# Patient Record
Sex: MALE | Race: WHITE | NOT HISPANIC OR LATINO
[De-identification: names, ages, dates, MRNs, and addresses within clinical notes are randomized per-mention and may not be internally consistent; named-entity substitution may affect disease eponyms.]

---

## 2019-08-22 ENCOUNTER — APPOINTMENT (OUTPATIENT)
Dept: FAMILY MEDICINE | Facility: CLINIC | Age: 54
End: 2019-08-22

## 2019-11-13 ENCOUNTER — APPOINTMENT (OUTPATIENT)
Dept: FAMILY MEDICINE | Facility: CLINIC | Age: 54
End: 2019-11-13

## 2019-11-27 ENCOUNTER — APPOINTMENT (OUTPATIENT)
Dept: FAMILY MEDICINE | Facility: CLINIC | Age: 54
End: 2019-11-27

## 2020-09-11 ENCOUNTER — INPATIENT (INPATIENT)
Facility: HOSPITAL | Age: 55
LOS: 3 days | Discharge: HOME | DRG: 558 | End: 2020-09-15
Attending: INTERNAL MEDICINE | Admitting: HOSPITALIST
Payer: MEDICAID

## 2020-09-11 VITALS
RESPIRATION RATE: 18 BRPM | TEMPERATURE: 99 F | DIASTOLIC BLOOD PRESSURE: 65 MMHG | OXYGEN SATURATION: 97 % | SYSTOLIC BLOOD PRESSURE: 94 MMHG | WEIGHT: 195.11 LBS | HEART RATE: 88 BPM

## 2020-09-11 DIAGNOSIS — M70.41 PREPATELLAR BURSITIS, RIGHT KNEE: ICD-10-CM

## 2020-09-11 DIAGNOSIS — L03.115 CELLULITIS OF RIGHT LOWER LIMB: ICD-10-CM

## 2020-09-11 LAB
ALBUMIN SERPL ELPH-MCNC: 3.5 G/DL — SIGNIFICANT CHANGE UP (ref 3.3–5)
ALP SERPL-CCNC: 70 U/L — SIGNIFICANT CHANGE UP (ref 40–120)
ALT FLD-CCNC: 22 U/L — SIGNIFICANT CHANGE UP (ref 12–78)
ANION GAP SERPL CALC-SCNC: 3 MMOL/L — LOW (ref 5–17)
APPEARANCE UR: ABNORMAL
APTT BLD: 34 SEC — SIGNIFICANT CHANGE UP (ref 27.5–35.5)
AST SERPL-CCNC: 19 U/L — SIGNIFICANT CHANGE UP (ref 15–37)
BASOPHILS # BLD AUTO: 0.06 K/UL — SIGNIFICANT CHANGE UP (ref 0–0.2)
BASOPHILS NFR BLD AUTO: 0.6 % — SIGNIFICANT CHANGE UP (ref 0–2)
BILIRUB SERPL-MCNC: 0.6 MG/DL — SIGNIFICANT CHANGE UP (ref 0.2–1.2)
BILIRUB UR-MCNC: NEGATIVE — SIGNIFICANT CHANGE UP
BUN SERPL-MCNC: 11 MG/DL — SIGNIFICANT CHANGE UP (ref 7–23)
CALCIUM SERPL-MCNC: 8.8 MG/DL — SIGNIFICANT CHANGE UP (ref 8.5–10.1)
CHLORIDE SERPL-SCNC: 106 MMOL/L — SIGNIFICANT CHANGE UP (ref 96–108)
CO2 SERPL-SCNC: 30 MMOL/L — SIGNIFICANT CHANGE UP (ref 22–31)
COLOR SPEC: SIGNIFICANT CHANGE UP
CREAT SERPL-MCNC: 0.94 MG/DL — SIGNIFICANT CHANGE UP (ref 0.5–1.3)
CRP SERPL-MCNC: 2.99 MG/DL — HIGH (ref 0–0.4)
DIFF PNL FLD: NEGATIVE — SIGNIFICANT CHANGE UP
EOSINOPHIL # BLD AUTO: 0.15 K/UL — SIGNIFICANT CHANGE UP (ref 0–0.5)
EOSINOPHIL NFR BLD AUTO: 1.4 % — SIGNIFICANT CHANGE UP (ref 0–6)
EPI CELLS # UR: SIGNIFICANT CHANGE UP
GLUCOSE SERPL-MCNC: 87 MG/DL — SIGNIFICANT CHANGE UP (ref 70–99)
GLUCOSE UR QL: NEGATIVE — SIGNIFICANT CHANGE UP
HCT VFR BLD CALC: 40.1 % — SIGNIFICANT CHANGE UP (ref 39–50)
HGB BLD-MCNC: 13.4 G/DL — SIGNIFICANT CHANGE UP (ref 13–17)
IMM GRANULOCYTES NFR BLD AUTO: 0.3 % — SIGNIFICANT CHANGE UP (ref 0–1.5)
INR BLD: 1.28 RATIO — HIGH (ref 0.88–1.16)
KETONES UR-MCNC: NEGATIVE — SIGNIFICANT CHANGE UP
LACTATE SERPL-SCNC: 0.7 MMOL/L — SIGNIFICANT CHANGE UP (ref 0.7–2)
LACTATE SERPL-SCNC: 2.3 MMOL/L — HIGH (ref 0.7–2)
LEUKOCYTE ESTERASE UR-ACNC: NEGATIVE — SIGNIFICANT CHANGE UP
LYMPHOCYTES # BLD AUTO: 1.94 K/UL — SIGNIFICANT CHANGE UP (ref 1–3.3)
LYMPHOCYTES # BLD AUTO: 18.6 % — SIGNIFICANT CHANGE UP (ref 13–44)
MCHC RBC-ENTMCNC: 30.1 PG — SIGNIFICANT CHANGE UP (ref 27–34)
MCHC RBC-ENTMCNC: 33.4 GM/DL — SIGNIFICANT CHANGE UP (ref 32–36)
MCV RBC AUTO: 90.1 FL — SIGNIFICANT CHANGE UP (ref 80–100)
MONOCYTES # BLD AUTO: 1.2 K/UL — HIGH (ref 0–0.9)
MONOCYTES NFR BLD AUTO: 11.5 % — SIGNIFICANT CHANGE UP (ref 2–14)
NEUTROPHILS # BLD AUTO: 7.06 K/UL — SIGNIFICANT CHANGE UP (ref 1.8–7.4)
NEUTROPHILS NFR BLD AUTO: 67.6 % — SIGNIFICANT CHANGE UP (ref 43–77)
NITRITE UR-MCNC: NEGATIVE — SIGNIFICANT CHANGE UP
NRBC # BLD: 0 /100 WBCS — SIGNIFICANT CHANGE UP (ref 0–0)
PH UR: 6 — SIGNIFICANT CHANGE UP (ref 5–8)
PLATELET # BLD AUTO: 237 K/UL — SIGNIFICANT CHANGE UP (ref 150–400)
POTASSIUM SERPL-MCNC: 3.8 MMOL/L — SIGNIFICANT CHANGE UP (ref 3.5–5.3)
POTASSIUM SERPL-SCNC: 3.8 MMOL/L — SIGNIFICANT CHANGE UP (ref 3.5–5.3)
PROT SERPL-MCNC: 7.2 G/DL — SIGNIFICANT CHANGE UP (ref 6–8.3)
PROT UR-MCNC: NEGATIVE — SIGNIFICANT CHANGE UP
PROTHROM AB SERPL-ACNC: 14.8 SEC — HIGH (ref 10.6–13.6)
RBC # BLD: 4.45 M/UL — SIGNIFICANT CHANGE UP (ref 4.2–5.8)
RBC # FLD: 12.8 % — SIGNIFICANT CHANGE UP (ref 10.3–14.5)
SARS-COV-2 RNA SPEC QL NAA+PROBE: SIGNIFICANT CHANGE UP
SODIUM SERPL-SCNC: 139 MMOL/L — SIGNIFICANT CHANGE UP (ref 135–145)
SP GR SPEC: 1 — LOW (ref 1.01–1.02)
UROBILINOGEN FLD QL: NEGATIVE — SIGNIFICANT CHANGE UP
WBC # BLD: 10.44 K/UL — SIGNIFICANT CHANGE UP (ref 3.8–10.5)
WBC # FLD AUTO: 10.44 K/UL — SIGNIFICANT CHANGE UP (ref 3.8–10.5)

## 2020-09-11 PROCEDURE — 73702 CT LWR EXTREMITY W/O&W/DYE: CPT | Mod: 26,RT

## 2020-09-11 PROCEDURE — 73562 X-RAY EXAM OF KNEE 3: CPT | Mod: 26,RT

## 2020-09-11 PROCEDURE — 99222 1ST HOSP IP/OBS MODERATE 55: CPT

## 2020-09-11 PROCEDURE — 71046 X-RAY EXAM CHEST 2 VIEWS: CPT | Mod: 26

## 2020-09-11 PROCEDURE — 99285 EMERGENCY DEPT VISIT HI MDM: CPT

## 2020-09-11 PROCEDURE — 93010 ELECTROCARDIOGRAM REPORT: CPT

## 2020-09-11 PROCEDURE — 93970 EXTREMITY STUDY: CPT | Mod: 26

## 2020-09-11 RX ORDER — VANCOMYCIN HCL 1 G
1350 VIAL (EA) INTRAVENOUS ONCE
Refills: 0 | Status: DISCONTINUED | OUTPATIENT
Start: 2020-09-11 | End: 2020-09-11

## 2020-09-11 RX ORDER — ENOXAPARIN SODIUM 100 MG/ML
40 INJECTION SUBCUTANEOUS DAILY
Refills: 0 | Status: DISCONTINUED | OUTPATIENT
Start: 2020-09-11 | End: 2020-09-15

## 2020-09-11 RX ORDER — ONDANSETRON 8 MG/1
4 TABLET, FILM COATED ORAL EVERY 6 HOURS
Refills: 0 | Status: DISCONTINUED | OUTPATIENT
Start: 2020-09-11 | End: 2020-09-15

## 2020-09-11 RX ORDER — IBUPROFEN 200 MG
600 TABLET ORAL EVERY 6 HOURS
Refills: 0 | Status: DISCONTINUED | OUTPATIENT
Start: 2020-09-11 | End: 2020-09-15

## 2020-09-11 RX ORDER — VANCOMYCIN HCL 1 G
1250 VIAL (EA) INTRAVENOUS EVERY 12 HOURS
Refills: 0 | Status: DISCONTINUED | OUTPATIENT
Start: 2020-09-11 | End: 2020-09-15

## 2020-09-11 RX ORDER — OXYCODONE HYDROCHLORIDE 5 MG/1
5 TABLET ORAL EVERY 6 HOURS
Refills: 0 | Status: DISCONTINUED | OUTPATIENT
Start: 2020-09-11 | End: 2020-09-15

## 2020-09-11 RX ORDER — SODIUM CHLORIDE 9 MG/ML
1000 INJECTION, SOLUTION INTRAVENOUS
Refills: 0 | Status: DISCONTINUED | OUTPATIENT
Start: 2020-09-11 | End: 2020-09-11

## 2020-09-11 RX ORDER — ACETAMINOPHEN 500 MG
650 TABLET ORAL EVERY 6 HOURS
Refills: 0 | Status: DISCONTINUED | OUTPATIENT
Start: 2020-09-11 | End: 2020-09-15

## 2020-09-11 RX ORDER — PIPERACILLIN AND TAZOBACTAM 4; .5 G/20ML; G/20ML
3.38 INJECTION, POWDER, LYOPHILIZED, FOR SOLUTION INTRAVENOUS EVERY 8 HOURS
Refills: 0 | Status: DISCONTINUED | OUTPATIENT
Start: 2020-09-11 | End: 2020-09-11

## 2020-09-11 RX ORDER — SODIUM CHLORIDE 9 MG/ML
2700 INJECTION INTRAMUSCULAR; INTRAVENOUS; SUBCUTANEOUS ONCE
Refills: 0 | Status: COMPLETED | OUTPATIENT
Start: 2020-09-11 | End: 2020-09-11

## 2020-09-11 RX ORDER — VANCOMYCIN HCL 1 G
1500 VIAL (EA) INTRAVENOUS ONCE
Refills: 0 | Status: COMPLETED | OUTPATIENT
Start: 2020-09-11 | End: 2020-09-11

## 2020-09-11 RX ORDER — KETOROLAC TROMETHAMINE 30 MG/ML
30 SYRINGE (ML) INJECTION ONCE
Refills: 0 | Status: DISCONTINUED | OUTPATIENT
Start: 2020-09-11 | End: 2020-09-11

## 2020-09-11 RX ADMIN — SODIUM CHLORIDE 2700 MILLILITER(S): 9 INJECTION INTRAMUSCULAR; INTRAVENOUS; SUBCUTANEOUS at 08:37

## 2020-09-11 RX ADMIN — Medication 300 MILLIGRAM(S): at 10:00

## 2020-09-11 RX ADMIN — Medication 30 MILLIGRAM(S): at 14:50

## 2020-09-11 RX ADMIN — Medication 30 MILLIGRAM(S): at 08:30

## 2020-09-11 RX ADMIN — Medication 166.67 MILLIGRAM(S): at 21:33

## 2020-09-11 NOTE — CONSULT NOTE ADULT - PROBLEM SELECTOR RECOMMENDATION 2
as above  Thank you for consulting us and involving us in the management of this most interesting and challenging case.     We will follow along in the care of this patient. Please call us at [phone] with any concerns.

## 2020-09-11 NOTE — ED PROVIDER NOTE - SKIN, MLM
Skin normal color for race, warm, dry and a granulating laceration with surrounding cellulitis over r knee

## 2020-09-11 NOTE — CONSULT NOTE ADULT - SUBJECTIVE AND OBJECTIVE BOX
55M presents for evaluation of severe right knee pain that has been progressive with redness. 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 He did not seek medical attention at that time, and was able to ambulate. He reports that the pain has been increasing with overlying redness. He denies fever/chills. Denies head trauma, LOC, or any other acute orthopedic injuries. 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     Vital Signs Last 24 Hrs  T(C): 37.2 (11 Sep 2020 07:40), Max: 37.2 (11 Sep 2020 07:40)  T(F): 99 (11 Sep 2020 07:40), Max: 99 (11 Sep 2020 07:40)  HR: 88 (11 Sep 2020 07:40) (88 - 88)  BP: 94/65 (11 Sep 2020 07:40) (94/65 - 94/65)  BP(mean): --  RR: 18 (11 Sep 2020 07:40) (18 - 18)  SpO2: 97% (11 Sep 2020 07:40) (97% - 97%)    Exam:  General: Awake, alert, no acute distress  RLE:   Erythema overlying anterior knee. Laceration over lateral aspect of knee.   Increasing warmth  TTP diffusely over knee joint  Limited AROM of knee joint secondary to pain. Pain with PROM to 70 degrees. Pain of knee with passive SLR.   SILT. Motor 5/5 L2-S1  DP pulse intact.  Compartments soft and compressible    Secondary Assessment:  NC/AT, NTTP of clavicles, NTTP of C-,T-,L-Spine, NTTP of Pelvis  UEs: NTTP of Shoulders, Elbows, Wrists, Hands; NT with AROM/PROM of Shoulders, Elbows, Wrists, Hands; AIN/PIN/Med/Uln/Msc/Rad/Ax intact  LEs: NT with Log Roll, NT with Heel Strike, NTTP of Hips, Ankles, Feet; NT with AROM/PROM of Hips, Ankles, Feet; Q/H/Gsc/TA/EHL/FHL intact

## 2020-09-11 NOTE — H&P ADULT - ASSESSMENT
56y/o M. with no significant past medical history presenting with right knee pain and swelling.  -Right LE cellulitis and Right knee Prepatellar bursitis:  Start Vancomycin 1250mg IV Q12h and analgesics PRN.  Check blood cx.  Appreciated ID and Orthopedic consult.   -VTE ppx: Lovenox 40mg SQ daily    IMPROVE VTE Individual Risk Assessment          RISK                                                          Points  [  ] Previous VTE                                                3  [  ] Thrombophilia                                             2  [  ] Lower limb paralysis                                   2        (unable to hold up >15 seconds)    [  ] Current Cancer                                             2         (within 6 months)  [  ] Immobilization > 24 hrs                              1  [  ] ICU/CCU stay > 24 hours                             1  [  ] Age > 60                                                         1    IMPROVE VTE Score: 0

## 2020-09-11 NOTE — H&P ADULT - HISTORY OF PRESENT ILLNESS
56y/o M. with no significant past medical history was doing some work on his house when he had stumbled over some debris three days ago and suffered a laceration on the lateral aspect of his right knee.  He proceeded to clean the laceration with peroxide and placed a dressing over it.  Last night patient had difficulty walking.  This morning he noticed severe swelling and pain of the right knee and came to ED for further evaluation and treatment.  He reports that he does not like to take narcotic pain medications.   He otherwise denied having fever, cough, SOB, CP, N/V, abdominal pain, diarrhea, or dysuria.  Rest of ROS negative.  In ED LE dopplers negative for DVT.  X-ray of right knee showed knee effusion and soft tissue swelling.  CT right knee with contrast showed moderate soft tissue swelling about the knee worse anteriorly with overlying skin wound concerning for cellulitis.  Rim-enhancing fluid collection likely representing a prepatellar bursa suggesting bursitis of infectious or inflammatory etiology.  Edema in the distal vastus lateralis with moderate fluid overlying the distal quadriceps myofascial plane concerning for myositis.  No CT evidence for osteomyelitis.  Nonspecific small knee joint effusion.

## 2020-09-11 NOTE — ED PROVIDER NOTE - CLINICAL SUMMARY MEDICAL DECISION MAKING FREE TEXT BOX
ro fx ro septic joint cx pain meds iv fluids abx xrays ortho consultation consideration for admission us ro dvt

## 2020-09-11 NOTE — ED PROVIDER NOTE - MUSCULOSKELETAL, MLM
pt has moderate effusion with redness and decreased flexion of r knee, he is able to straighten against gravity but only bend 10 degrees. there is bl jointline tender no calf pain but the extremity is slightly swollen neuro vasc intact

## 2020-09-11 NOTE — CONSULT NOTE ADULT - SUBJECTIVE AND OBJECTIVE BOX
[facility] DIVISION of INFECTIOUS DISEASE  Viktor Browne MD PhD, Diamante Garcia MD, Miladis Coe MD, Kamila Carpenter MD  and providing coverage with Hailey Garrison MD and Carmen Munoz MD  Providing Infectious Disease Consultations at [facility], 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   He sustained a laceration to the lateral knee and he bandaged it but did not seek medical attention  	last john he began to have pain and swelling to the r knee and now has much more pain swelling redness and is unable to fully flex his r knee but very tender superior to knee cap.  	he denies fever chills or other injuries      PAST MEDICAL & SURGICAL HISTORY:  No pertinent past medical history  No significant past surgical history      Antimicrobials  piperacillin/tazobactam IVPB.. 3.375 Gram(s) IV Intermittent every 8 hours  vancomycin  IVPB 1250 milliGRAM(s) IV Intermittent every 12 hours      Immunological      Other  acetaminophen   Tablet .. 650 milliGRAM(s) Oral every 6 hours PRN  enoxaparin Injectable 40 milliGRAM(s) SubCutaneous daily  ibuprofen  Tablet. 600 milliGRAM(s) Oral every 6 hours PRN  ondansetron Injectable 4 milliGRAM(s) IV Push every 6 hours PRN  oxyCODONE    IR 5 milliGRAM(s) Oral every 6 hours PRN      Allergies    No Known Allergies    Intolerances        SOCIAL HISTORY:  as above      FAMILY HISTORY: noncontrib      ROS:    EYES:  Negative  blurry vision or double vision  GASTROINTESTINAL:  Negative for nausea, vomiting, diarrhea  -otherwise negative except for subjective    Vital Signs Last 24 Hrs  T(C): 37.2 (11 Sep 2020 07:40), Max: 37.2 (11 Sep 2020 07:40)  T(F): 99 (11 Sep 2020 07:40), Max: 99 (11 Sep 2020 07:40)  HR: 88 (11 Sep 2020 07:40) (88 - 88)  BP: 94/65 (11 Sep 2020 07:40) (94/65 - 94/65)  BP(mean): --  RR: 18 (11 Sep 2020 07:40) (18 - 18)  SpO2: 97% (11 Sep 2020 07:40) (97% - 97%)    PE:  WDWN in no distress  HEENT:  NC, PERRL, sclerae anicteric, conjunctivae clear, EOMI.  Sinuses nontender, no nasal exudate.  No buccal or pharyngeal lesions, erythema or exudate  Neck:  Supple, no adenopathy  Lungs:  No accessory muscle use, bilaterally clear to auscultation  Cor:  RRR, S1, S2, no murmur appreciated  Abd:  Symmetric, normoactive BS.  Soft, nontender, no masses, guarding or rebound.  Liver and spleen not enlarged  Extrem/Skin: right le with swelling, warmth, erythema and tenderness and bogginess superior to right patella with no sig issues on ROM  Neuro: grossly intact  Musc: moving all limbs freely, no focal deficits    LABS:                        13.4   10.44 )-----------( 237      ( 11 Sep 2020 08:34 )             40.1       WBC Count: 10.44 K/uL (20 @ 08:34)          139  |  106  |  11  ----------------------------<  87  3.8   |  30  |  0.94    Ca    8.8      11 Sep 2020 08:34    TPro  7.2  /  Alb  3.5  /  TBili  0.6  /  DBili  x   /  AST  19  /  ALT  22  /  AlkPhos  70        Creatinine, Serum: 0.94 mg/dL (20 @ 08:34)      Urinalysis Basic - ( 11 Sep 2020 11:02 )    Color: Pale Yellow / Appearance: Slightly Turbid / S.005 / pH: x  Gluc: x / Ketone: Negative  / Bili: Negative / Urobili: Negative   Blood: x / Protein: Negative / Nitrite: Negative   Leuk Esterase: Negative / RBC: x / WBC x   Sq Epi: x / Non Sq Epi: Occasional / Bacteria: x              MICROBIOLOGY:      RADIOLOGY & ADDITIONAL STUDIES:    --< from: CT Knee w/wo IV Cont, Right (20 @ 11:07) >  EXAM:  CT KNEE ONLY WAW IC RT                            PROCEDURE DATE:  2020          INTERPRETATION:  CT KNEE WITHOUT AND WITH IV CONTRAST RIGHT dated 2020 11:07 AM    INDICATION: Knee pain and swelling.    COMPARISON: Knee radiographs dated 2020    TECHNIQUE: CT imaging of the right knee was performed with and without contrast. The data was reformatted in the axial, coronal, and sagittal planes.  Contrast: Omnipaque 350. Administered: 100 cc. Discarded: 0 cc.    FINDINGS:    OSSEOUS STRUCTURES: There is no fracture. Relative preservation of the joint spaces. No cortical erosion or destruction noted.  SYNOVIUM/ JOINT FLUID: Small knee joint effusion.  TENDONS: Extensor mechanism is intact.  MUSCLES: There is edema within the vastus lateralis muscle distally. Moderate edema overlying the distal quadriceps myofascial plane  NEUROVASCULAR STRUCTURES: Preserved  SUBCUTANEOUS SOFT TISSUES: There is moderate to large soft tissue swelling about the knee. A focal skin defect is identified at the anterolateral aspect of the knee measuring 0.7 cm. There is a rim-enhancing fluid collection overlying the patella likely representing the prepatellar bursa. This fluid collection measures 1.1 x 5.0 x 4.5 cm.      IMPRESSION:    1.  Moderate soft tissue swelling about the knee worse anteriorly with overlying skin wound concerning for cellulitis.  2.  Rim-enhancing fluid collection likely representing a prepatellar bursa suggesting bursitis of infectious or inflammatory etiology.  3.  Edema in the distal vastus lateralis with moderate fluid overlying the distal quadriceps myofascial plane concerning for myositis.  4.  No CT evidence for osteomyelitis.  5.  Nonspecific small knee joint effusion

## 2020-09-11 NOTE — ED PROVIDER NOTE - OBJECTIVE STATEMENT
pt is a 54 yo male who has no past medical history sp shoulder and arm surgery by dr claire, does not smoke tobacco but is daily smoker of marijuana was at home 2 nights ago and fell as he was on the stairs striking his right knee.   he sustained a laceration to the lateral knee and he bandaged it.  last john he began to have pain and swelling to the r knee and now has much more pain swelling redness and is unable to fully flex his r knee.  he denies fever chills or other injuries  last tdap 2 years ago

## 2020-09-11 NOTE — CONSULT NOTE ADULT - ASSESSMENT
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

## 2020-09-11 NOTE — H&P ADULT - EXTREMITIES COMMENTS
swelling of the right knee with surrounding erythema and warmth.  Decreased range of motion of right knee

## 2020-09-11 NOTE — CONSULT NOTE ADULT - PROBLEM SELECTOR RECOMMENDATION 9
highest risk is for staph aureus and in this region higher risk for MRSA so agree with Vanco with pharmacy dosing and goal trough 15-20 for now but with overlying cellulitis agree with ortho to avoid aspiration with risk.  Potential to de-escalate to PO based on response as early as 9/14

## 2020-09-11 NOTE — CONSULT NOTE ADULT - ASSESSMENT
55M presents with worsening right knee pain and redness    Plan:  - Medical management appreciated  - XR and CT right knee ordered/reviewed  - FU labs/cultures  - WBAT  - Do not plan to aspirate knee joint at this time as CT imaging reviewed with no signs of arthrotomy and only minimal knee effusion present, and do not want to introduce bacteria from overlying cellulitis into joint.   - No plan for acute orthopedic surgical intervention at this time.   - Discussed case with Dr. Zamora and advise if any changes to plan 55M presents with worsening right knee pain and redness    Plan:  - Medical management appreciated  - XR and CT right knee ordered/reviewed  - FU labs/cultures  - WBAT  - Do not plan to aspirate knee joint at this time as CT imaging reviewed with no signs of arthrotomy and only minimal knee effusion present, and do not want to introduce bacteria from overlying cellulitis into joint.   - Recommend antibiotics  - No plan for acute orthopedic surgical intervention at this time.   - Discussed case with Dr. Zamora and advise if any changes to plan 55M presents with worsening right knee pain and redness    Plan:  - Medical management appreciated  - XR and CT right knee ordered/reviewed  - FU labs/cultures  - Do not plan to aspirate knee joint at this time as CT imaging reviewed with no signs of arthrotomy and only minimal knee effusion present, and do not want to introduce bacteria from overlying cellulitis into joint.   - Recommend antibiotics  - WBAT  - No plan for acute orthopedic surgical intervention at this time. Orthopedically stable and will sign off.   - Discussed case with Dr. Zamora and will advise if any changes to plan 55M presents with worsening right knee cellulitis     Plan:  - Medical management per primary team  - XR and CT right knee reviewed  - FU labs/cultures  - Do not plan to aspirate knee joint at this time as CT imaging reviewed with no signs of arthrotomy and only minimal knee effusion present; significant soft tissue edema pre patellar consistent wit cellulitis without obvious abscess, no indication for irrigation and debridement at this time  - Recommend antibiotics and ID consult  - WBAT RLE  - No orthopedic surgical intervention warranted at this time  - Ortho stable for discharge  - Discussed case with Dr. Zamora who agrees    Dionisio Steven DO  PGY2, Orthopaedic Surgery

## 2020-09-11 NOTE — ED PROVIDER NOTE - PROGRESS NOTE DETAILS
oro service paged for consultation [facility] service paged for consultation, 8:50 ortho paged via long range as no call back ortho paged via long range as no call back on short range- aware. will see pt pt is doing better, cellulitis is improving, ortho ordered ct of knee, the residents are awaiting dr Zamora to call back and discuss case after ct. river oneal will admit

## 2020-09-12 LAB
ANION GAP SERPL CALC-SCNC: 4 MMOL/L — LOW (ref 5–17)
BASOPHILS # BLD AUTO: 0.05 K/UL — SIGNIFICANT CHANGE UP (ref 0–0.2)
BASOPHILS NFR BLD AUTO: 0.7 % — SIGNIFICANT CHANGE UP (ref 0–2)
BUN SERPL-MCNC: 8 MG/DL — SIGNIFICANT CHANGE UP (ref 7–23)
CALCIUM SERPL-MCNC: 8.4 MG/DL — LOW (ref 8.5–10.1)
CHLORIDE SERPL-SCNC: 109 MMOL/L — HIGH (ref 96–108)
CO2 SERPL-SCNC: 29 MMOL/L — SIGNIFICANT CHANGE UP (ref 22–31)
CREAT SERPL-MCNC: 0.54 MG/DL — SIGNIFICANT CHANGE UP (ref 0.5–1.3)
CULTURE RESULTS: SIGNIFICANT CHANGE UP
EOSINOPHIL # BLD AUTO: 0.25 K/UL — SIGNIFICANT CHANGE UP (ref 0–0.5)
EOSINOPHIL NFR BLD AUTO: 3.5 % — SIGNIFICANT CHANGE UP (ref 0–6)
GLUCOSE SERPL-MCNC: 103 MG/DL — HIGH (ref 70–99)
HCT VFR BLD CALC: 36 % — LOW (ref 39–50)
HGB BLD-MCNC: 12.1 G/DL — LOW (ref 13–17)
IMM GRANULOCYTES NFR BLD AUTO: 0.1 % — SIGNIFICANT CHANGE UP (ref 0–1.5)
LYMPHOCYTES # BLD AUTO: 1.76 K/UL — SIGNIFICANT CHANGE UP (ref 1–3.3)
LYMPHOCYTES # BLD AUTO: 24.3 % — SIGNIFICANT CHANGE UP (ref 13–44)
MAGNESIUM SERPL-MCNC: 2.2 MG/DL — SIGNIFICANT CHANGE UP (ref 1.6–2.6)
MCHC RBC-ENTMCNC: 30.2 PG — SIGNIFICANT CHANGE UP (ref 27–34)
MCHC RBC-ENTMCNC: 33.6 GM/DL — SIGNIFICANT CHANGE UP (ref 32–36)
MCV RBC AUTO: 89.8 FL — SIGNIFICANT CHANGE UP (ref 80–100)
MONOCYTES # BLD AUTO: 0.8 K/UL — SIGNIFICANT CHANGE UP (ref 0–0.9)
MONOCYTES NFR BLD AUTO: 11 % — SIGNIFICANT CHANGE UP (ref 2–14)
MRSA PCR RESULT.: DETECTED
NEUTROPHILS # BLD AUTO: 4.37 K/UL — SIGNIFICANT CHANGE UP (ref 1.8–7.4)
NEUTROPHILS NFR BLD AUTO: 60.4 % — SIGNIFICANT CHANGE UP (ref 43–77)
NRBC # BLD: 0 /100 WBCS — SIGNIFICANT CHANGE UP (ref 0–0)
PLATELET # BLD AUTO: 210 K/UL — SIGNIFICANT CHANGE UP (ref 150–400)
POTASSIUM SERPL-MCNC: 3.7 MMOL/L — SIGNIFICANT CHANGE UP (ref 3.5–5.3)
POTASSIUM SERPL-SCNC: 3.7 MMOL/L — SIGNIFICANT CHANGE UP (ref 3.5–5.3)
RBC # BLD: 4.01 M/UL — LOW (ref 4.2–5.8)
RBC # FLD: 12.6 % — SIGNIFICANT CHANGE UP (ref 10.3–14.5)
S AUREUS DNA NOSE QL NAA+PROBE: DETECTED
SODIUM SERPL-SCNC: 142 MMOL/L — SIGNIFICANT CHANGE UP (ref 135–145)
SPECIMEN SOURCE: SIGNIFICANT CHANGE UP
VANCOMYCIN TROUGH SERPL-MCNC: 13.9 UG/ML — SIGNIFICANT CHANGE UP (ref 10–20)
WBC # BLD: 7.24 K/UL — SIGNIFICANT CHANGE UP (ref 3.8–10.5)
WBC # FLD AUTO: 7.24 K/UL — SIGNIFICANT CHANGE UP (ref 3.8–10.5)

## 2020-09-12 PROCEDURE — 99233 SBSQ HOSP IP/OBS HIGH 50: CPT | Mod: GC

## 2020-09-12 RX ADMIN — ENOXAPARIN SODIUM 40 MILLIGRAM(S): 100 INJECTION SUBCUTANEOUS at 11:43

## 2020-09-12 RX ADMIN — Medication 166.67 MILLIGRAM(S): at 11:40

## 2020-09-12 RX ADMIN — Medication 166.67 MILLIGRAM(S): at 22:06

## 2020-09-12 NOTE — PROGRESS NOTE ADULT - ASSESSMENT
56y/o M. with no significant past medical history presenting with right knee pain and swelling.  -Right LE cellulitis and Right knee Prepatellar bursitis:  Start Vancomycin 1250mg IV Q12h and analgesics PRN.    ortho and ID eval  worsenig effusion, suspect it from inflamtory response  f/u ID   -VTE ppx: Lovenox 40mg SQ daily    IMPROVE VTE Individual Risk Assessment          RISK                                                          Points  [  ] Previous VTE                                                3  [  ] Thrombophilia                                             2  [  ] Lower limb paralysis                                   2        (unable to hold up >15 seconds)    [  ] Current Cancer                                             2         (within 6 months)  [  ] Immobilization > 24 hrs                              1  [  ] ICU/CCU stay > 24 hours                             1  [  ] Age > 60                                                         1    IMPROVE VTE Score: 0

## 2020-09-13 PROCEDURE — 99232 SBSQ HOSP IP/OBS MODERATE 35: CPT | Mod: GC

## 2020-09-13 RX ADMIN — ENOXAPARIN SODIUM 40 MILLIGRAM(S): 100 INJECTION SUBCUTANEOUS at 11:44

## 2020-09-13 RX ADMIN — Medication 166.67 MILLIGRAM(S): at 21:58

## 2020-09-13 RX ADMIN — Medication 166.67 MILLIGRAM(S): at 11:44

## 2020-09-13 NOTE — PROGRESS NOTE ADULT - ASSESSMENT
54y/o M. with no significant past medical history presenting with right knee pain and swelling.  -Right LE cellulitis and Right knee Prepatellar bursitis:  Start Vancomycin 1250mg IV Q12h and analgesics PRN.    ortho and ID eval  clinically improving  continue on vanco   f/u ID   -VTE ppx: Lovenox 40mg SQ daily    IMPROVE VTE Individual Risk Assessment          RISK                                                          Points  [  ] Previous VTE                                                3  [  ] Thrombophilia                                             2  [  ] Lower limb paralysis                                   2        (unable to hold up >15 seconds)    [  ] Current Cancer                                             2         (within 6 months)  [  ] Immobilization > 24 hrs                              1  [  ] ICU/CCU stay > 24 hours                             1  [  ] Age > 60                                                         1    IMPROVE VTE Score: 0

## 2020-09-14 ENCOUNTER — TRANSCRIPTION ENCOUNTER (OUTPATIENT)
Age: 55
End: 2020-09-14

## 2020-09-14 LAB — VANCOMYCIN TROUGH SERPL-MCNC: 6.7 UG/ML — LOW (ref 10–20)

## 2020-09-14 PROCEDURE — 99232 SBSQ HOSP IP/OBS MODERATE 35: CPT | Mod: GC

## 2020-09-14 PROCEDURE — 36573 INSJ PICC RS&I 5 YR+: CPT | Mod: 53

## 2020-09-14 RX ORDER — VANCOMYCIN HCL 1 G
1.25 VIAL (EA) INTRAVENOUS
Qty: 0 | Refills: 0 | DISCHARGE
Start: 2020-09-14

## 2020-09-14 RX ORDER — VANCOMYCIN HCL 1 G
1.25 VIAL (EA) INTRAVENOUS
Qty: 42.5 | Refills: 0
Start: 2020-09-14 | End: 2020-09-30

## 2020-09-14 RX ORDER — OXYCODONE HYDROCHLORIDE 5 MG/1
1 TABLET ORAL
Qty: 28 | Refills: 0
Start: 2020-09-14 | End: 2020-09-20

## 2020-09-14 RX ADMIN — Medication 166.67 MILLIGRAM(S): at 10:15

## 2020-09-14 RX ADMIN — ENOXAPARIN SODIUM 40 MILLIGRAM(S): 100 INJECTION SUBCUTANEOUS at 12:06

## 2020-09-14 RX ADMIN — Medication 166.67 MILLIGRAM(S): at 21:05

## 2020-09-14 NOTE — PROGRESS NOTE ADULT - ASSESSMENT
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    9/11-started

## 2020-09-14 NOTE — DISCHARGE NOTE PROVIDER - NSDCCPCAREPLAN_GEN_ALL_CORE_FT
PRINCIPAL DISCHARGE DIAGNOSIS  Diagnosis: Cellulitis of right lower extremity  Assessment and Plan of Treatment: to [facility] for 3 week until October 1st      SECONDARY DISCHARGE DIAGNOSES  Diagnosis: Myositis  Assessment and Plan of Treatment:

## 2020-09-14 NOTE — PROGRESS NOTE ADULT - PROVIDER SPECIALTY LIST ADULT
Hospitalist
Hospitalist
Infectious Disease
Intervent Radiology
Intervent Radiology
Infectious Disease
Hospitalist

## 2020-09-14 NOTE — DISCHARGE NOTE PROVIDER - HOSPITAL COURSE
56y/o M. with no significant past medical history presenting with right knee pain and swelling.  -Right LE cellulitis and Right knee Prepatellar bursitis:  Start Vancomycin 1250mg IV Q12h and analgesics PRN.    as per ID 3 weeks of IV abx, to complete october 1st  ortho and ID eval appreicated   clinically improving     56y/o M. with no significant past medical history presenting with right knee pain and swelling.  -Right LE cellulitis and Right knee Prepatellar bursitis:  Start Vancomycin 1250mg IV Q12h and analgesics PRN.    as per ID 3 weeks of IV abx, to complete october 1st  ortho and ID eval appreicated   clinically improving      PE  nad  chest s1, s2  lungs decrease air entr at the bases  abd:BS+

## 2020-09-14 NOTE — DISCHARGE NOTE PROVIDER - CARE PROVIDER_API CALL
Viktor Browne  INFECTIOUS DISEASE  [address] [place] [id number]  Phone: [phone]  Fax: [phone]  Follow Up Time:

## 2020-09-14 NOTE — DISCHARGE NOTE PROVIDER - NSDCMRMEDTOKEN_GEN_ALL_CORE_FT
oxyCODONE 5 mg oral tablet: 1 tab(s) orally every 6 hours, As needed, Severe Pain (7 - 10) MDD:20 mg  vancomycin 1.25 g intravenous injection: 1.25 gram(s) intravenous 2 times a day

## 2020-09-14 NOTE — PROGRESS NOTE ADULT - SUBJECTIVE AND OBJECTIVE BOX
Patient is a 55y old  Male who presents with a chief complaint of Right knee pain and swelling (12 Sep 2020 17:02)        HPI:  54y/o M. with no significant past medical history was doing some work on his house when he had stumbled over some debris three days ago and suffered a laceration on the lateral aspect of his right knee.  He proceeded to clean the laceration with peroxide and placed a dressing over it.  Last night patient had difficulty walking.  This morning he noticed severe swelling and pain of the right knee and came to ED for further evaluation and treatment.  He reports that he does not like to take narcotic pain medications.   He otherwise denied having fever, cough, SOB, CP, N/V, abdominal pain, diarrhea, or dysuria.  Rest of ROS negative.  In ED LE dopplers negative for DVT.  X-ray of right knee showed knee effusion and soft tissue swelling.  CT right knee with contrast showed moderate soft tissue swelling about the knee worse anteriorly with overlying skin wound concerning for cellulitis.  Rim-enhancing fluid collection likely representing a prepatellar bursa suggesting bursitis of infectious or inflammatory etiology.  Edema in the distal vastus lateralis with moderate fluid overlying the distal quadriceps myofascial plane concerning for myositis.  No CT evidence for osteomyelitis.  Nonspecific small knee joint effusion. (11 Sep 2020 14:49)      SUBJECTIVE & OBJECTIVE: Pt seen and examined at bedside. knee swelling subsiding     PHYSICAL EXAM:  T(C): 36.6 (20 @ 06:42), Max: 36.9 (20 @ 13:18)  HR: 59 (20 @ 06:42) (59 - 76)  BP: 114/75 (20 @ 06:42) (113/68 - 156/68)  RR: 17 (20 @ 06:42) (16 - 17)  SpO2: 96% (20 @ 06:42) (96% - 97%)  Wt(kg): --   GENERAL: NAD, well-groomed, well-developed  HEAD:  Atraumatic, Normocephalic  NERVOUS SYSTEM:  Alert & Oriented X3,   CHEST/LUNG: Clear to auscultation bilaterally; No rales, rhonchi, wheezing, or rubs  HEART: Regular rate and rhythm; No murmurs, rubs, or gallops  ABDOMEN: Soft, Nontender, Nondistended; Bowel sounds present  EXTREMITIES:  2+ Peripheral Pulses, No clubbing, cyanosis, or edema        MEDICATIONS  (STANDING):  enoxaparin Injectable 40 milliGRAM(s) SubCutaneous daily  vancomycin  IVPB 1250 milliGRAM(s) IV Intermittent every 12 hours    MEDICATIONS  (PRN):  acetaminophen   Tablet .. 650 milliGRAM(s) Oral every 6 hours PRN Temp greater or equal to 38C (100.4F), Mild Pain (1 - 3)  ibuprofen  Tablet. 600 milliGRAM(s) Oral every 6 hours PRN Moderate Pain (4 - 6)  ondansetron Injectable 4 milliGRAM(s) IV Push every 6 hours PRN Nausea  oxyCODONE    IR 5 milliGRAM(s) Oral every 6 hours PRN Severe Pain (7 - 10)      LABS:                        12.1   7.24  )-----------( 210      ( 12 Sep 2020 06:44 )             36.0     09-12    142  |  109<H>  |  8   ----------------------------<  103<H>  3.7   |  29  |  0.54    Ca    8.4<L>      12 Sep 2020 06:44  Mg     2.2     09-12        Urinalysis Basic - ( 11 Sep 2020 11:02 )    Color: Pale Yellow / Appearance: Slightly Turbid / S.005 / pH: x  Gluc: x / Ketone: Negative  / Bili: Negative / Urobili: Negative   Blood: x / Protein: Negative / Nitrite: Negative   Leuk Esterase: Negative / RBC: x / WBC x   Sq Epi: x / Non Sq Epi: Occasional / Bacteria: x        CAPILLARY BLOOD GLUCOSE          CAPILLARY BLOOD GLUCOSE        CAPILLARY BLOOD GLUCOSE                RECENT CULTURES:      RADIOLOGY & ADDITIONAL TESTS:                        DVT/GI ppx  Discussed with pt @ bedside
Covering Dr Browne and Dr Jose SANTACRUZ, MELANIE is a 55yMale , patient examined and chart reviewed    INTERVAL HPI/ OVERNIGHT EVENTS:  Afebrile. No events.    Past Medical History--  PAST MEDICAL & SURGICAL HISTORY:  No pertinent past medical history    No significant past surgical history      For details regarding the patient's social history, family history, and other miscellaneous elements, please refer the initial infectious diseases consultation and/or the admitting history and physical examination for this admission.    ROS:  CONSTITUTIONAL:  Negative fever or chills  EYES:  Negative  blurry vision or double vision  CARDIOVASCULAR:  Negative for chest pain or palpitations  RESPIRATORY:  Negative for cough, wheezing, or SOB   GASTROINTESTINAL:  Negative for nausea, vomiting, diarrhea, constipation, or abdominal pain  GENITOURINARY:  Negative frequency, urgency , dysuria or hematuria   NEUROLOGIC:  No headache, confusion, dizziness, lightheadedness  All other systems were reviewed and are negative       Current inpatient medications :    ANTIBIOTICS/RELEVANT:  vancomycin  IVPB 1250 milliGRAM(s) IV Intermittent every 12 hours      acetaminophen   Tablet .. 650 milliGRAM(s) Oral every 6 hours PRN  enoxaparin Injectable 40 milliGRAM(s) SubCutaneous daily  ibuprofen  Tablet. 600 milliGRAM(s) Oral every 6 hours PRN  ondansetron Injectable 4 milliGRAM(s) IV Push every 6 hours PRN  oxyCODONE    IR 5 milliGRAM(s) Oral every 6 hours PRN      Objective:     @ 07:01  -   @ 07:00  --------------------------------------------------------  IN: 0 mL / OUT: 700 mL / NET: -700 mL      T(C): 36.9 (20 @ 13:18), Max: 36.9 (20 @ 13:18)  HR: 76 (20 @ 13:18) (67 - 76)  BP: 113/68 (20 @ 13:18) (112/66 - 121/72)  RR: 16 (20 @ 13:18) (16 - 19)  SpO2: 96% (20 @ 13:18) (96% - 97%)    Physical Exam:  GEN: NAD, pleasant  HEENT: normocephalic and atraumatic. EOMI. JASON. Moist mucosa. Clear Posterior pharynx.  NECK: Supple. No carotid bruits.  No lymphadenopathy or thyromegaly.  LUNGS: Clear to auscultation.  HEART: Regular rate and rhythm without murmur.  ABDOMEN: Soft, nontender, and nondistended.  Positive bowel sounds.  No hepatosplenomegaly was noted.  EXTREMITIES: right knee swelling with fluctuance + erythema +tender  NEUROLOGIC: A & O x3, No focal neurological deficits   SKIN: No ulceration or induration present.      LABS:                        12.1   7.24  )-----------( 210      ( 12 Sep 2020 06:44 )             36.0       09-12    142  |  109<H>  |  8   ----------------------------<  103<H>  3.7   |  29  |  0.54    Ca    8.4<L>      12 Sep 2020 06:44  Mg     2.2     12    TPro  7.2  /  Alb  3.5  /  TBili  0.6  /  DBili  x   /  AST  19  /  ALT  22  /  AlkPhos  70  09-11      PT/INR - ( 11 Sep 2020 08:34 )   PT: 14.8 sec;   INR: 1.28 ratio         PTT - ( 11 Sep 2020 08:34 )  PTT:34.0 sec  Urinalysis Basic - ( 11 Sep 2020 11:02 )    Color: Pale Yellow / Appearance: Slightly Turbid / S.005 / pH: x  Gluc: x / Ketone: Negative  / Bili: Negative / Urobili: Negative   Blood: x / Protein: Negative / Nitrite: Negative   Leuk Esterase: Negative / RBC: x / WBC x   Sq Epi: x / Non Sq Epi: Occasional / Bacteria: x    MICROBIOLOGY:    Culture - Urine (collected 11 Sep 2020 15:25)  Source: .Urine Clean Catch (Midstream)  Final Report (12 Sep 2020 12:13):    <10,000 CFU/mL Normal Urogenital Anjana    Culture - Blood (collected 11 Sep 2020 11:06)  Source: .Blood Blood-Peripheral  Preliminary Report (12 Sep 2020 12:01):    No growth to date.    Culture - Blood (collected 11 Sep 2020 11:06)  Source: .Blood Blood-Peripheral  Preliminary Report (12 Sep 2020 12:01):    No growth to date.    RADIOLOGY & ADDITIONAL STUDIES:    EXAM:  CT KNEE ONLY WAW IC RT                            PROCEDURE DATE:  2020          INTERPRETATION:  CT KNEE WITHOUT AND WITH IV CONTRAST RIGHT dated 2020 11:07 AM    INDICATION: Knee pain and swelling.    COMPARISON: Knee radiographs dated 2020    TECHNIQUE: CT imaging of the right knee was performed with and without contrast. The data was reformatted in the axial, coronal, and sagittal planes.  Contrast: Omnipaque 350. Administered: 100 cc. Discarded: 0 cc.    FINDINGS:    OSSEOUS STRUCTURES: There is no fracture. Relative preservation of the joint spaces. No cortical erosion or destruction noted.  SYNOVIUM/ JOINT FLUID: Small knee joint effusion.  TENDONS: Extensor mechanism is intact.  MUSCLES: There is edema within the vastus lateralis muscle distally. Moderate edema overlying the distal quadriceps myofascial plane  NEUROVASCULAR STRUCTURES: Preserved  SUBCUTANEOUS SOFT TISSUES: There is moderate to large soft tissue swelling about the knee. A focal skin defect is identified at the anterolateral aspect of the knee measuring 0.7 cm. There is a rim-enhancing fluid collection overlying the patella likely representing the prepatellar bursa. This fluid collection measures 1.1 x 5.0 x 4.5 cm.      IMPRESSION:    1.  Moderate soft tissue swelling about the knee worse anteriorly with overlying skin wound concerning for cellulitis.  2.  Rim-enhancing fluid collection likely representing a prepatellar bursa suggesting bursitis of infectious or inflammatory etiology.  3.  Edema in the distal vastus lateralis with moderate fluid overlying the distal quadriceps myofascial plane concerning for myositis.  4.  No CT evidence for osteomyelitis.  5.  Nonspecific small knee joint effusion      Assessment :  56 YO M admitted with Right prepatellar bursitis with cellulitis. CT noted.    Plan:   Cont Vancomycin  Fu cultures  Trend temps and cbc  May need I&D if no improvement    Continue with present regiment.  Appropriate use of antibiotics and adverse effects reviewed.    I have discussed the above plan of care with patient/ family in detail. They expressed understanding of the the treatment plan . Risks, benefits and alternatives discussed in detail. I have asked if they have any questions or concerns and appropriately addressed them to the best of my ability .      Critical care time greater then 35 minutes reviewing notes, labs data/ imaging , discussion with multidisciplinary team.    Thank you for allowing me to participate in care of your patient .        Carmen Munoz MD  Infectious Disease  [phone]
Interventional Radiology Brief Post Procedure Note    Procedure: Midline catheter placement    Operators: Brent Sevilla MD    Anesthesia (type): Local lidocaine    Contrast: None.     EBL: Minimal    Findings/Follow up Plan of Care: Successful midline catheter placement via the right basilic vein.    Specimens Removed: None.     Implants: Single lumen midline catheter.    Complications: No immediate complications.    Condition/Disposition: Back to inpatient room.     Please call Interventional Radiology x [phone] with any questions, concerns, or issues.
Patient is a 55y old  Male who presents with a chief complaint of Right knee pain and swelling (11 Sep 2020 14:49)        HPI:  54y/o M. with no significant past medical history was doing some work on his house when he had stumbled over some debris three days ago and suffered a laceration on the lateral aspect of his right knee.  He proceeded to clean the laceration with peroxide and placed a dressing over it.  Last night patient had difficulty walking.  This morning he noticed severe swelling and pain of the right knee and came to ED for further evaluation and treatment.  He reports that he does not like to take narcotic pain medications.   He otherwise denied having fever, cough, SOB, CP, N/V, abdominal pain, diarrhea, or dysuria.  Rest of ROS negative.  In ED LE dopplers negative for DVT.  X-ray of right knee showed knee effusion and soft tissue swelling.  CT right knee with contrast showed moderate soft tissue swelling about the knee worse anteriorly with overlying skin wound concerning for cellulitis.  Rim-enhancing fluid collection likely representing a prepatellar bursa suggesting bursitis of infectious or inflammatory etiology.  Edema in the distal vastus lateralis with moderate fluid overlying the distal quadriceps myofascial plane concerning for myositis.  No CT evidence for osteomyelitis.  Nonspecific small knee joint effusion. (11 Sep 2020 14:49)      SUBJECTIVE & OBJECTIVE: Pt seen and examined at bedside. knee pain and effusion     PHYSICAL EXAM:  T(C): 36.4 (20 @ 21:11), Max: 36.5 (20 @ 17:01)  HR: 67 (20 @ 21:11) (67 - 71)  BP: 112/66 (20 @ 21:11) (112/66 - 122/71)  RR: 18 (20 @ 21:11) (18 - 18)  SpO2: 97% (20 @ 21:11) (97% - 97%)  Wt(kg): --   GENERAL: NAD  HEAD:  Atraumatic, Normocephalic  NERVOUS SYSTEM:  Alert & Oriented X3,   CHEST/LUNG: Clear to auscultation bilaterally; No rales, rhonchi, wheezing, or rubs  HEART: Regular rate and rhythm; No murmurs, rubs, or gallops  ABDOMEN: Soft, Nontender, Nondistended; Bowel sounds present  EXTREMITIES:  2+ Peripheral Pulses, No clubbing, cyanosis, or edema        MEDICATIONS  (STANDING):  enoxaparin Injectable 40 milliGRAM(s) SubCutaneous daily  vancomycin  IVPB 1250 milliGRAM(s) IV Intermittent every 12 hours    MEDICATIONS  (PRN):  acetaminophen   Tablet .. 650 milliGRAM(s) Oral every 6 hours PRN Temp greater or equal to 38C (100.4F), Mild Pain (1 - 3)  ibuprofen  Tablet. 600 milliGRAM(s) Oral every 6 hours PRN Moderate Pain (4 - 6)  ondansetron Injectable 4 milliGRAM(s) IV Push every 6 hours PRN Nausea  oxyCODONE    IR 5 milliGRAM(s) Oral every 6 hours PRN Severe Pain (7 - 10)      LABS:                        12.1   7.24  )-----------( 210      ( 12 Sep 2020 06:44 )             36.0         142  |  109<H>  |  8   ----------------------------<  103<H>  3.7   |  29  |  0.54    Ca    8.4<L>      12 Sep 2020 06:44  Mg     2.2         TPro  7.2  /  Alb  3.5  /  TBili  0.6  /  DBili  x   /  AST  19  /  ALT  22  /  AlkPhos  70  09-11    PT/INR - ( 11 Sep 2020 08:34 )   PT: 14.8 sec;   INR: 1.28 ratio         PTT - ( 11 Sep 2020 08:34 )  PTT:34.0 sec  Urinalysis Basic - ( 11 Sep 2020 11:02 )    Color: Pale Yellow / Appearance: Slightly Turbid / S.005 / pH: x  Gluc: x / Ketone: Negative  / Bili: Negative / Urobili: Negative   Blood: x / Protein: Negative / Nitrite: Negative   Leuk Esterase: Negative / RBC: x / WBC x   Sq Epi: x / Non Sq Epi: Occasional / Bacteria: x      Magnesium, Serum: 2.2 mg/dL ( @ 06:44)    CAPILLARY BLOOD GLUCOSE          CAPILLARY BLOOD GLUCOSE        CAPILLARY BLOOD GLUCOSE                RECENT CULTURES:      RADIOLOGY & ADDITIONAL TESTS:                        DVT/GI ppx  Discussed with pt @ bedside
Patient is a 55y old  Male who presents with a chief complaint of Right knee pain and swelling (13 Sep 2020 10:49)        HPI:  54y/o M. with no significant past medical history was doing some work on his house when he had stumbled over some debris three days ago and suffered a laceration on the lateral aspect of his right knee.  He proceeded to clean the laceration with peroxide and placed a dressing over it.  Last night patient had difficulty walking.  This morning he noticed severe swelling and pain of the right knee and came to ED for further evaluation and treatment.  He reports that he does not like to take narcotic pain medications.   He otherwise denied having fever, cough, SOB, CP, N/V, abdominal pain, diarrhea, or dysuria.  Rest of ROS negative.  In ED LE dopplers negative for DVT.  X-ray of right knee showed knee effusion and soft tissue swelling.  CT right knee with contrast showed moderate soft tissue swelling about the knee worse anteriorly with overlying skin wound concerning for cellulitis.  Rim-enhancing fluid collection likely representing a prepatellar bursa suggesting bursitis of infectious or inflammatory etiology.  Edema in the distal vastus lateralis with moderate fluid overlying the distal quadriceps myofascial plane concerning for myositis.  No CT evidence for osteomyelitis.  Nonspecific small knee joint effusion. (11 Sep 2020 14:49)      SUBJECTIVE & OBJECTIVE: Pt seen and examined at bedside.knee sitll tender with mild touch     PHYSICAL EXAM:  T(C): 36.6 (09-14-20 @ 04:57), Max: 36.9 (09-13-20 @ 21:28)  HR: 64 (09-14-20 @ 04:57) (64 - 71)  BP: 121/77 (09-14-20 @ 04:57) (121/77 - 123/76)  RR: 18 (09-14-20 @ 04:57) (17 - 18)  SpO2: 96% (09-14-20 @ 04:57) (96% - 97%)  Wt(kg): --   GENERAL: NAD, well-groomed, well-developed  NERVOUS SYSTEM:  Alert & Oriented X3,   CHEST/LUNG: cta  HEART: Regular rate and rhythm; No murmurs, rubs, or gallops  ABDOMEN: Soft, Nontender, Nondistended; Bowel sounds present  EXTREMITIES:  2+ Peripheral Pulses, No clubbing, cyanosis, or edema        MEDICATIONS  (STANDING):  enoxaparin Injectable 40 milliGRAM(s) SubCutaneous daily  vancomycin  IVPB 1250 milliGRAM(s) IV Intermittent every 12 hours    MEDICATIONS  (PRN):  acetaminophen   Tablet .. 650 milliGRAM(s) Oral every 6 hours PRN Temp greater or equal to 38C (100.4F), Mild Pain (1 - 3)  ibuprofen  Tablet. 600 milliGRAM(s) Oral every 6 hours PRN Moderate Pain (4 - 6)  ondansetron Injectable 4 milliGRAM(s) IV Push every 6 hours PRN Nausea  oxyCODONE    IR 5 milliGRAM(s) Oral every 6 hours PRN Severe Pain (7 - 10)      LABS:                CAPILLARY BLOOD GLUCOSE          CAPILLARY BLOOD GLUCOSE        CAPILLARY BLOOD GLUCOSE                RECENT CULTURES:      RADIOLOGY & ADDITIONAL TESTS:                        DVT/GI ppx  Discussed with pt @ bedside
Vascular & Interventional Radiology Pre-Procedure Note    Procedure Name: Midline catheter placement    HPI: 55y Male with need for long term antibiotics. Midline catheter placement is requested.    Allergies:   Medications (Abx/Cardiac/Anticoagulation/Blood Products)  enoxaparin Injectable: 40 milliGRAM(s) SubCutaneous (09-14 @ 12:06)  vancomycin  IVPB: 166.67 mL/Hr IV Intermittent (09-14 @ 10:15)    Data:    T(C): 36.8  HR: 69  BP: 129/70  RR: 17  SpO2: 95%    -WBC 7.24 / HgB 12.1 / Hct 36.0 / Plt 210  -Na 142 / Cl 109 / BUN 8 / Glucose 103  -K 3.7 / CO2 29 / Cr 0.54  -ALT -- / Alk Phos -- / T.Bili --  -INR1.28    Plan:   -55y Male presents for midline catheter placement. Procedure and risks discussed with patient and he is agreeable to proceed.   -Risks/Benefits/alternatives explained with the patient and/or healthcare proxy and witnessed informed consent obtained.   
[facility] DIVISION of INFECTIOUS DISEASE  Viktor Browne MD PhD, Diamante Garcia MD, Miladis Coe MD, Kamila Carpenter MD  and providing coverage with Hailey Garrison MD and Carmen Munoz MD  Providing Infectious Disease Consultations at [facility], [facility]  [phone]    infectious diseases progress note:    MELANIE SANTACRUZ is a 55y y. o. Male patient    No concerning overnight events    Allergies    No Known Allergies    Intolerances        ANTIBIOTICS/RELEVANT:  antimicrobials  vancomycin  IVPB 1250 milliGRAM(s) IV Intermittent every 12 hours    immunologic:    OTHER:  acetaminophen   Tablet .. 650 milliGRAM(s) Oral every 6 hours PRN  enoxaparin Injectable 40 milliGRAM(s) SubCutaneous daily  ibuprofen  Tablet. 600 milliGRAM(s) Oral every 6 hours PRN  ondansetron Injectable 4 milliGRAM(s) IV Push every 6 hours PRN  oxyCODONE    IR 5 milliGRAM(s) Oral every 6 hours PRN      Objective:  Vital Signs Last 24 Hrs  T(C): 36.6 (14 Sep 2020 04:57), Max: 36.9 (13 Sep 2020 21:28)  T(F): 97.8 (14 Sep 2020 04:57), Max: 98.5 (13 Sep 2020 21:28)  HR: 64 (14 Sep 2020 04:57) (64 - 71)  BP: 121/77 (14 Sep 2020 04:57) (121/77 - 123/76)  BP(mean): --  RR: 18 (14 Sep 2020 04:57) (17 - 18)  SpO2: 96% (14 Sep 2020 04:57) (96% - 97%)    T(C): 36.6 (09-14-20 @ 04:57), Max: 36.9 (09-12-20 @ 13:18)  T(C): 36.6 (09-14-20 @ 04:57), Max: 36.9 (09-12-20 @ 13:18)  T(C): 36.6 (09-14-20 @ 04:57), Max: 37.2 (09-11-20 @ 07:40)    PHYSICAL EXAM:  HEENT: NC atraumatic  Neck: supple  Respiratory: no accessory muscle use, breathing comfortably  Cardiovascular: distant  Gastrointestinal: normal appearing, nondistended  Extremities: no clubbing, no cyanosis, right knee less swollen, less warm, less red,       LABS:        7.24 09-12 @ 06:44  10.44 09-11 @ 08:34              Creatinine, Serum: 0.54 mg/dL (09-12-20 @ 06:44)  Creatinine, Serum: 0.94 mg/dL (09-11-20 @ 08:34)                INFLAMMATORY MARKERS  Auto Neutrophil #: 4.37 K/uL (09-12-20 @ 06:44)  Auto Lymphocyte #: 1.76 K/uL (09-12-20 @ 06:44)  Auto Neutrophil #: 7.06 K/uL (09-11-20 @ 08:34)  Auto Lymphocyte #: 1.94 K/uL (09-11-20 @ 08:34)    Lactate, Blood: 0.7 mmol/L (09-11-20 @ 12:57)  Lactate, Blood: 2.3 mmol/L (09-11-20 @ 08:34)    Auto Eosinophil #: 0.25 K/uL (09-12-20 @ 06:44)  Auto Eosinophil #: 0.15 K/uL (09-11-20 @ 08:34)      Sedimentation Rate, Erythrocyte: 12 mm/hr (09-11-20 @ 08:34)                    Activated Partial Thromboplastin Time: 34.0 sec (09-11-20 @ 08:34)  INR: 1.28 ratio (09-11-20 @ 08:34)          MICROBIOLOGY:              RADIOLOGY & ADDITIONAL STUDIES:

## 2020-09-14 NOTE — PROGRESS NOTE ADULT - ASSESSMENT
56y/o M. with no significant past medical history presenting with right knee pain and swelling.  -Right LE cellulitis and Right knee Prepatellar bursitis:  Start Vancomycin 1250mg IV Q12h and analgesics PRN.    ortho and ID eval  clinically improving  continue on vanco   if does not improve further would need washout  f/u ID   -VTE ppx: Lovenox 40mg SQ daily    IMPROVE VTE Individual Risk Assessment          RISK                                                          Points  [  ] Previous VTE                                                3  [  ] Thrombophilia                                             2  [  ] Lower limb paralysis                                   2        (unable to hold up >15 seconds)    [  ] Current Cancer                                             2         (within 6 months)  [  ] Immobilization > 24 hrs                              1  [  ] ICU/CCU stay > 24 hours                             1  [  ] Age > 60                                                         1    IMPROVE VTE Score: 0

## 2020-09-14 NOTE — PROGRESS NOTE ADULT - PROBLEM SELECTOR PLAN 1
highest risk is for staph aureus and in this region higher risk for MRSA so recommend Vanco with pharmacy dosing and goal trough 15-20 for now and with overlying cellulitis agree with ortho to avoid aspiration with risk.  in this case recommend completion of 3 weeks of Vanco with +MRSA screen  Vancomycin 1250mg IV q12 x 3 weeks so last day 10/1  weekly labs CBC, BMP, ESR, Vanco trough faxed to [phone]  pt to call [phone] to schedule apt for next week

## 2020-09-14 NOTE — DISCHARGE NOTE NURSING/CASE MANAGEMENT/SOCIAL WORK - PATIENT PORTAL LINK FT
You can access the FollowMyHealth Patient Portal offered by [facility] by registering at the following website: [email or web address]. By joining [facility]’s FollowMyHealth portal, you will also be able to view your health information using other applications (apps) compatible with our system.

## 2020-09-14 NOTE — PROGRESS NOTE ADULT - REASON FOR ADMISSION
Right knee pain and swelling

## 2020-09-14 NOTE — PROGRESS NOTE ADULT - PROBLEM SELECTOR PLAN 2
as above  From an ID standpoint no further requirement for inpatient status after placement of midline (ordered) and arrangement of outpt abx for the management of ID issues. Fine with discharge from ID standpoint when other medical issues no longer require inpatient care and social issues allow for a safe discharge plan.  Thank you for consulting us and involving us in the management of this most interesting and challenging case.     Please call us at [phone] with any concerns or further questions and have pt call to arrange outpt visit for next week.

## 2020-09-15 VITALS
TEMPERATURE: 98 F | OXYGEN SATURATION: 96 % | DIASTOLIC BLOOD PRESSURE: 64 MMHG | SYSTOLIC BLOOD PRESSURE: 118 MMHG | HEART RATE: 58 BPM | RESPIRATION RATE: 17 BRPM

## 2020-09-15 PROCEDURE — 83605 ASSAY OF LACTIC ACID: CPT

## 2020-09-15 PROCEDURE — 96374 THER/PROPH/DIAG INJ IV PUSH: CPT

## 2020-09-15 PROCEDURE — 87086 URINE CULTURE/COLONY COUNT: CPT

## 2020-09-15 PROCEDURE — 81001 URINALYSIS AUTO W/SCOPE: CPT

## 2020-09-15 PROCEDURE — 96375 TX/PRO/DX INJ NEW DRUG ADDON: CPT

## 2020-09-15 PROCEDURE — 86900 BLOOD TYPING SEROLOGIC ABO: CPT

## 2020-09-15 PROCEDURE — 87640 STAPH A DNA AMP PROBE: CPT

## 2020-09-15 PROCEDURE — 87641 MR-STAPH DNA AMP PROBE: CPT

## 2020-09-15 PROCEDURE — 86901 BLOOD TYPING SEROLOGIC RH(D): CPT

## 2020-09-15 PROCEDURE — 86769 SARS-COV-2 COVID-19 ANTIBODY: CPT

## 2020-09-15 PROCEDURE — 86850 RBC ANTIBODY SCREEN: CPT

## 2020-09-15 PROCEDURE — 87040 BLOOD CULTURE FOR BACTERIA: CPT

## 2020-09-15 PROCEDURE — 86140 C-REACTIVE PROTEIN: CPT

## 2020-09-15 PROCEDURE — 83735 ASSAY OF MAGNESIUM: CPT

## 2020-09-15 PROCEDURE — 85610 PROTHROMBIN TIME: CPT

## 2020-09-15 PROCEDURE — 93005 ELECTROCARDIOGRAM TRACING: CPT

## 2020-09-15 PROCEDURE — 80048 BASIC METABOLIC PNL TOTAL CA: CPT

## 2020-09-15 PROCEDURE — C1751: CPT

## 2020-09-15 PROCEDURE — 80202 ASSAY OF VANCOMYCIN: CPT

## 2020-09-15 PROCEDURE — 99239 HOSP IP/OBS DSCHRG MGMT >30: CPT | Mod: GC

## 2020-09-15 PROCEDURE — 36415 COLL VENOUS BLD VENIPUNCTURE: CPT

## 2020-09-15 PROCEDURE — 99285 EMERGENCY DEPT VISIT HI MDM: CPT

## 2020-09-15 PROCEDURE — 76937 US GUIDE VASCULAR ACCESS: CPT

## 2020-09-15 PROCEDURE — 99053 MED SERV 10PM-8AM 24 HR FAC: CPT

## 2020-09-15 PROCEDURE — 85652 RBC SED RATE AUTOMATED: CPT

## 2020-09-15 PROCEDURE — 80053 COMPREHEN METABOLIC PANEL: CPT

## 2020-09-15 PROCEDURE — 93970 EXTREMITY STUDY: CPT

## 2020-09-15 PROCEDURE — 85025 COMPLETE CBC W/AUTO DIFF WBC: CPT

## 2020-09-15 PROCEDURE — 73702 CT LWR EXTREMITY W/O&W/DYE: CPT

## 2020-09-15 PROCEDURE — 71046 X-RAY EXAM CHEST 2 VIEWS: CPT

## 2020-09-15 PROCEDURE — 36573 INSJ PICC RS&I 5 YR+: CPT

## 2020-09-15 PROCEDURE — U0003: CPT

## 2020-09-15 PROCEDURE — 85730 THROMBOPLASTIN TIME PARTIAL: CPT

## 2020-09-15 PROCEDURE — 73562 X-RAY EXAM OF KNEE 3: CPT

## 2020-09-15 RX ADMIN — Medication 166.67 MILLIGRAM(S): at 12:37

## 2020-09-16 LAB
CULTURE RESULTS: SIGNIFICANT CHANGE UP
CULTURE RESULTS: SIGNIFICANT CHANGE UP
SPECIMEN SOURCE: SIGNIFICANT CHANGE UP
SPECIMEN SOURCE: SIGNIFICANT CHANGE UP

## 2023-07-11 ENCOUNTER — EMERGENCY (EMERGENCY)
Facility: HOSPITAL | Age: 58
LOS: 1 days | Discharge: HOME | End: 2023-07-11
Attending: EMERGENCY MEDICINE | Admitting: EMERGENCY MEDICINE
Payer: COMMERCIAL

## 2023-07-11 VITALS
RESPIRATION RATE: 18 BRPM | WEIGHT: 212.97 LBS | HEART RATE: 62 BPM | SYSTOLIC BLOOD PRESSURE: 159 MMHG | OXYGEN SATURATION: 98 % | DIASTOLIC BLOOD PRESSURE: 91 MMHG | HEIGHT: 75 IN | TEMPERATURE: 98 F

## 2023-07-11 LAB
ALBUMIN SERPL ELPH-MCNC: 3.8 G/DL — SIGNIFICANT CHANGE UP (ref 3.3–5)
ALP SERPL-CCNC: 69 U/L — SIGNIFICANT CHANGE UP (ref 40–120)
ALT FLD-CCNC: 40 U/L — SIGNIFICANT CHANGE UP (ref 12–78)
ANION GAP SERPL CALC-SCNC: 2 MMOL/L — LOW (ref 5–17)
APPEARANCE UR: CLEAR — SIGNIFICANT CHANGE UP
APTT BLD: 35.5 SEC — SIGNIFICANT CHANGE UP (ref 27.5–35.5)
AST SERPL-CCNC: 39 U/L — HIGH (ref 15–37)
BASOPHILS # BLD AUTO: 0.05 K/UL — SIGNIFICANT CHANGE UP (ref 0–0.2)
BASOPHILS NFR BLD AUTO: 0.7 % — SIGNIFICANT CHANGE UP (ref 0–2)
BILIRUB SERPL-MCNC: 0.3 MG/DL — SIGNIFICANT CHANGE UP (ref 0.2–1.2)
BILIRUB UR-MCNC: NEGATIVE — SIGNIFICANT CHANGE UP
BUN SERPL-MCNC: 10 MG/DL — SIGNIFICANT CHANGE UP (ref 7–23)
CALCIUM SERPL-MCNC: 9.2 MG/DL — SIGNIFICANT CHANGE UP (ref 8.5–10.1)
CHLORIDE SERPL-SCNC: 111 MMOL/L — HIGH (ref 96–108)
CO2 SERPL-SCNC: 28 MMOL/L — SIGNIFICANT CHANGE UP (ref 22–31)
COLOR SPEC: YELLOW — SIGNIFICANT CHANGE UP
CREAT SERPL-MCNC: 0.77 MG/DL — SIGNIFICANT CHANGE UP (ref 0.5–1.3)
DIFF PNL FLD: NEGATIVE — SIGNIFICANT CHANGE UP
EGFR: 104 ML/MIN/1.73M2 — SIGNIFICANT CHANGE UP
EOSINOPHIL # BLD AUTO: 0.1 K/UL — SIGNIFICANT CHANGE UP (ref 0–0.5)
EOSINOPHIL NFR BLD AUTO: 1.5 % — SIGNIFICANT CHANGE UP (ref 0–6)
GLUCOSE SERPL-MCNC: 125 MG/DL — HIGH (ref 70–99)
GLUCOSE UR QL: NEGATIVE MG/DL — SIGNIFICANT CHANGE UP
HCT VFR BLD CALC: 41 % — SIGNIFICANT CHANGE UP (ref 39–50)
HGB BLD-MCNC: 13.4 G/DL — SIGNIFICANT CHANGE UP (ref 13–17)
IMM GRANULOCYTES NFR BLD AUTO: 0.3 % — SIGNIFICANT CHANGE UP (ref 0–0.9)
INR BLD: 1.08 RATIO — SIGNIFICANT CHANGE UP (ref 0.88–1.16)
KETONES UR-MCNC: NEGATIVE MG/DL — SIGNIFICANT CHANGE UP
LACTATE SERPL-SCNC: 1.7 MMOL/L — SIGNIFICANT CHANGE UP (ref 0.7–2)
LEUKOCYTE ESTERASE UR-ACNC: NEGATIVE — SIGNIFICANT CHANGE UP
LYMPHOCYTES # BLD AUTO: 1.37 K/UL — SIGNIFICANT CHANGE UP (ref 1–3.3)
LYMPHOCYTES # BLD AUTO: 20.4 % — SIGNIFICANT CHANGE UP (ref 13–44)
MCHC RBC-ENTMCNC: 30.2 PG — SIGNIFICANT CHANGE UP (ref 27–34)
MCHC RBC-ENTMCNC: 32.7 GM/DL — SIGNIFICANT CHANGE UP (ref 32–36)
MCV RBC AUTO: 92.6 FL — SIGNIFICANT CHANGE UP (ref 80–100)
MONOCYTES # BLD AUTO: 0.45 K/UL — SIGNIFICANT CHANGE UP (ref 0–0.9)
MONOCYTES NFR BLD AUTO: 6.7 % — SIGNIFICANT CHANGE UP (ref 2–14)
NEUTROPHILS # BLD AUTO: 4.72 K/UL — SIGNIFICANT CHANGE UP (ref 1.8–7.4)
NEUTROPHILS NFR BLD AUTO: 70.4 % — SIGNIFICANT CHANGE UP (ref 43–77)
NITRITE UR-MCNC: NEGATIVE — SIGNIFICANT CHANGE UP
NRBC # BLD: 0 /100 WBCS — SIGNIFICANT CHANGE UP (ref 0–0)
NT-PROBNP SERPL-SCNC: 264 PG/ML — HIGH (ref 0–125)
PH UR: 7 — SIGNIFICANT CHANGE UP (ref 5–8)
PLATELET # BLD AUTO: 251 K/UL — SIGNIFICANT CHANGE UP (ref 150–400)
POTASSIUM SERPL-MCNC: 4.4 MMOL/L — SIGNIFICANT CHANGE UP (ref 3.5–5.3)
POTASSIUM SERPL-SCNC: 4.4 MMOL/L — SIGNIFICANT CHANGE UP (ref 3.5–5.3)
PROT SERPL-MCNC: 7.6 G/DL — SIGNIFICANT CHANGE UP (ref 6–8.3)
PROT UR-MCNC: NEGATIVE MG/DL — SIGNIFICANT CHANGE UP
PROTHROM AB SERPL-ACNC: 12.6 SEC — SIGNIFICANT CHANGE UP (ref 10.5–13.4)
RBC # BLD: 4.43 M/UL — SIGNIFICANT CHANGE UP (ref 4.2–5.8)
RBC # FLD: 12.9 % — SIGNIFICANT CHANGE UP (ref 10.3–14.5)
SODIUM SERPL-SCNC: 141 MMOL/L — SIGNIFICANT CHANGE UP (ref 135–145)
SP GR SPEC: 1.01 — SIGNIFICANT CHANGE UP (ref 1–1.03)
UROBILINOGEN FLD QL: 0.2 MG/DL — SIGNIFICANT CHANGE UP (ref 0.2–1)
WBC # BLD: 6.71 K/UL — SIGNIFICANT CHANGE UP (ref 3.8–10.5)
WBC # FLD AUTO: 6.71 K/UL — SIGNIFICANT CHANGE UP (ref 3.8–10.5)

## 2023-07-11 PROCEDURE — 93010 ELECTROCARDIOGRAM REPORT: CPT

## 2023-07-11 PROCEDURE — 99285 EMERGENCY DEPT VISIT HI MDM: CPT | Mod: 25

## 2023-07-11 PROCEDURE — 83880 ASSAY OF NATRIURETIC PEPTIDE: CPT

## 2023-07-11 PROCEDURE — 85025 COMPLETE CBC W/AUTO DIFF WBC: CPT

## 2023-07-11 PROCEDURE — 83605 ASSAY OF LACTIC ACID: CPT

## 2023-07-11 PROCEDURE — 85610 PROTHROMBIN TIME: CPT

## 2023-07-11 PROCEDURE — 80053 COMPREHEN METABOLIC PANEL: CPT

## 2023-07-11 PROCEDURE — 85730 THROMBOPLASTIN TIME PARTIAL: CPT

## 2023-07-11 PROCEDURE — 71045 X-RAY EXAM CHEST 1 VIEW: CPT

## 2023-07-11 PROCEDURE — 81003 URINALYSIS AUTO W/O SCOPE: CPT

## 2023-07-11 PROCEDURE — 71045 X-RAY EXAM CHEST 1 VIEW: CPT | Mod: 26

## 2023-07-11 PROCEDURE — 93005 ELECTROCARDIOGRAM TRACING: CPT

## 2023-07-11 PROCEDURE — 99285 EMERGENCY DEPT VISIT HI MDM: CPT

## 2023-07-11 PROCEDURE — 87040 BLOOD CULTURE FOR BACTERIA: CPT

## 2023-07-11 PROCEDURE — 36415 COLL VENOUS BLD VENIPUNCTURE: CPT

## 2023-07-11 NOTE — ED STATDOCS - ATTENDING APP SHARED VISIT CONTRIBUTION OF CARE
59yo male with intermittent joint swelling for the last 10 days, wrists and legs, no fever, ?recent lyme exposure  exam: lungs cta, rrr, no edema, neurovasc intact  plan: labs, lyme panel, rheum follow up  agree with assessement and plan of PA

## 2023-07-11 NOTE — ED PROVIDER NOTE - ATTENDING APP SHARED VISIT CONTRIBUTION OF CARE
59yo male with joint swelling on and off for 10 days, no recent travel no sick contacts  exam: lungs cta, no edema, neuro vasc intact, FROMx4  pan: labs, fluids, lyme, rheum follow up  agree with assessment and plan of PA

## 2023-07-11 NOTE — ED PROVIDER NOTE - CARE PROVIDER_API CALL
Elena Del Rio  Rheumatology  [address] [place] [id number]  Phone: [phone]  Fax: [phone]  Scheduled Appointment: 07/13/2023

## 2023-07-11 NOTE — ED PROVIDER NOTE - PATIENT PORTAL LINK FT
You can access the FollowMyHealth Patient Portal offered by [facility] by registering at the following website: [email or web address]. By joining [employer]’s FollowMyHealth portal, you will also be able to view your health information using other applications (apps) compatible with our system.

## 2023-07-11 NOTE — ED ADULT NURSE NOTE - OBJECTIVE STATEMENT
patient comes in with complaints of "swelling" to all extremities x 1 week with increased fatigue noted.  no complaints of cp/sob/palpitations, Patient states he was recently treated for an abscess to his mouth and was treated with abx.

## 2023-07-11 NOTE — ED PROVIDER NOTE - OBJECTIVE STATEMENT
58-year-old male without reported past medical history presents today complaining of generalized weakness and intermittent extremity swelling.  Patient reports that he has noticed that he has had various locations such as his legs and arms swollen then resolved.  Patient reports that he would run frequently 3 months ago but had to discontinue running due to neuroma.  Patient also reports he has been doing a lot of gardening recently. pt recent had a dental abscess drained and finished a full course of antibiotics. pt notes the swelling is currently improved. Pt denies fever, vomiting, diarrhea, dysuria, renal disease hx, chest pain, shortness of breath, rash, or any other complaints.

## 2023-07-11 NOTE — ED ADULT TRIAGE NOTE - CHIEF COMPLAINT QUOTE
Pt ambulatory to triage c/o "swelling" to all extremities x 1 week with increased fatigue noted.  Pt denies cp/sob/palpitations, respirations even and unlabored.  Skin warm and dry though pt does appear pale, denies acute bleeding.   Pt states he was recently treated for an abscess to his mouth and was treated with abx.   Reports feeling "the chills".  Unknown fevers at home. BN

## 2023-07-11 NOTE — ED PROVIDER NOTE - PHYSICAL EXAMINATION
Constitutional: Awake, Alert, non-toxic. NAD. Well appearing, well nourished.   HEAD: Normocephalic, atraumatic.   EYES: EOM intact, conjunctiva and sclera are clear bilaterally.   ENT: No rhinorrhea, patent, mucous membranes pink/moist, no drooling or stridor.   NECK: Supple, non-tender  CARDIOVASCULAR: Normal S1, S2; regular rate and rhythm.  RESPIRATORY: Normal respiratory effort; breath sounds CTAB, no wheezes, rhonchi, or rales. Speaking in full sentences. No accessory muscle use.   ABDOMEN: Soft; non-tender, non-distended.   EXTREMITIES: Full passive and active ROM in all extremities; non-tender to palpation; distal pulses palpable and symmetric, no extremity pitting edema.   SKIN: Warm, dry; good skin turgor, no apparent lesions or rashes, no ecchymosis, brisk capillary refill.  NEURO: A&O x3. Sensory and motor functions are grossly intact. Speech is normal. Appearance and judgement seem appropriate for gender and age.

## 2023-07-11 NOTE — ED PROVIDER NOTE - NS ED ATTENDING STATEMENT MOD
This was a shared visit with the TRA. I reviewed and verified the documentation and independently performed the documented:

## 2023-07-11 NOTE — ED PROVIDER NOTE - NSFOLLOWUPINSTRUCTIONS_ED_ALL_ED_FT
Follow up with your primary care doctor, rheumatology, and ID. You have a pending tick panel. Return for fever, chest pain, shortness of breath, worsening condition.     Fatigue      If you have fatigue, you feel tired all the time and have a lack of energy or a lack of motivation. Fatigue may make it difficult to start or complete tasks because of exhaustion.    Occasional or mild fatigue is often a normal response to activity or life. However, long-term (chronic) or extreme fatigue may be a symptom of a medical condition such as:  Depression.  Not having enough red blood cells or hemoglobin in the blood (anemia).  A problem with a small gland located in the lower front part of the neck (thyroid disorder).  Rheumatologic conditions. These are problems related to the body's defense system (immune system).  Infections, especially certain viral infections.  Fatigue can also lead to negative health outcomes over time.    Follow these instructions at home:  Medicines    Take over-the-counter and prescription medicines only as told by your health care provider.  Take a multivitamin if told by your health care provider.  Do not use herbal or dietary supplements unless they are approved by your health care provider.  Eating and drinking    A comparison of three sample cups showing dark yellow, yellow, and pale yellow urine.  Avoid heavy meals in the evening.  Eat a well-balanced diet, which includes lean proteins, whole grains, plenty of fruits and vegetables, and low-fat dairy products.  Avoid eating or drinking too many products with caffeine in them.  Avoid alcohol.  Drink enough fluid to keep your urine pale yellow.  Activity    A person sitting on the floor doing yoga.  Exercise regularly, as told by your health care provider.  Use or practice techniques to help you relax, such as yoga, ayala chi, meditation, or massage therapy.  Lifestyle    Change situations that cause you stress. Try to keep your work and personal schedules in balance.  Do not use recreational or illegal drugs.  General instructions    Monitor your fatigue for any changes.  Go to bed and get up at the same time every day.  Avoid fatigue by pacing yourself during the day and getting enough sleep at night.  Maintain a healthy weight.  Contact a health care provider if:  Your fatigue does not get better.  You have a fever.  You suddenly lose or gain weight.  You have headaches.  You have trouble falling asleep or sleeping through the night.  You feel angry, guilty, anxious, or sad.  You have swelling in your legs or another part of your body.  Get help right away if:  You feel confused, feel like you might faint, or faint.  Your vision is blurry or you have a severe headache.  You have severe pain in your abdomen, your back, or the area between your waist and hips (pelvis).  You have chest pain, shortness of breath, or an irregular or fast heartbeat.  You are unable to urinate, or you urinate less than normal.  You have abnormal bleeding from the rectum, nose, lungs, nipples, or, if you are female, the vagina.  You vomit blood.  You have thoughts about hurting yourself or others.  These symptoms may be an emergency. Get help right away. Call 911.  Do not wait to see if the symptoms will go away.  Do not drive yourself to the hospital.  Get help right away if you feel like you may hurt yourself or others, or have thoughts about taking your own life. Go to your nearest emergency room or:  Call 911.  Call the National Suicide Prevention Lifeline at 1-198.641.3785 or 215. This is open 24 hours a day.  Text the Crisis Text Line at 900625.  Summary  If you have fatigue, you feel tired all the time and have a lack of energy or a lack of motivation.  Fatigue may make it difficult to start or complete tasks because of exhaustion.  Long-term (chronic) or extreme fatigue may be a symptom of a medical condition.  Exercise regularly, as told by your health care provider.  Change situations that cause you stress. Try to keep your work and personal schedules in balance.  This information is not intended to replace advice given to you by your health care provider. Make sure you discuss any questions you have with your health care provider.

## 2023-07-11 NOTE — ED ADULT NURSE NOTE - NSFALLUNIVINTERV_ED_ALL_ED
Bed/Stretcher in lowest position, wheels locked, appropriate side rails in place/Call bell, personal items and telephone in reach/Instruct patient to call for assistance before getting out of bed/chair/stretcher/Non-slip footwear applied when patient is off stretcher/[place] to call system/Physically safe environment - no spills, clutter or unnecessary equipment/Purposeful proactive rounding/Room/bathroom lighting operational, light cord in reach

## 2023-07-13 ENCOUNTER — NON-APPOINTMENT (OUTPATIENT)
Age: 58
End: 2023-07-13

## 2023-07-13 ENCOUNTER — APPOINTMENT (OUTPATIENT)
Dept: RHEUMATOLOGY | Facility: CLINIC | Age: 58
End: 2023-07-13
Payer: COMMERCIAL

## 2023-07-13 VITALS
HEIGHT: 75 IN | DIASTOLIC BLOOD PRESSURE: 80 MMHG | WEIGHT: 207 LBS | TEMPERATURE: 97.3 F | SYSTOLIC BLOOD PRESSURE: 110 MMHG | HEART RATE: 63 BPM | OXYGEN SATURATION: 96 % | BODY MASS INDEX: 25.74 KG/M2

## 2023-07-13 DIAGNOSIS — M25.50 PAIN IN UNSPECIFIED JOINT: ICD-10-CM

## 2023-07-13 PROCEDURE — 99204 OFFICE O/P NEW MOD 45 MIN: CPT

## 2023-07-13 RX ORDER — METHYLPREDNISOLONE 4 MG/1
4 TABLET ORAL
Qty: 1 | Refills: 0 | Status: ACTIVE | COMMUNITY
Start: 2023-07-13 | End: 1900-01-01

## 2023-08-23 ENCOUNTER — APPOINTMENT (OUTPATIENT)
Dept: RHEUMATOLOGY | Facility: CLINIC | Age: 58
End: 2023-08-23

## 2024-07-02 NOTE — H&P ADULT - NSICDXNOPASTSURGICALHX_GEN_ALL_CORE
----- Message from Patient Portal,  sent at 6/29/2024  6:07 AM CDT -----  Regarding: Notification of Unviewed Test Results  Contact: [phone]  HUONG ERICKSON has not viewed the following results:  - GLUCOSE LEVEL  - LIPID PANEL WITH REFLEX   <-- Click to add NO significant Past Surgical History